# Patient Record
Sex: MALE | Race: WHITE | Employment: FULL TIME | URBAN - METROPOLITAN AREA
[De-identification: names, ages, dates, MRNs, and addresses within clinical notes are randomized per-mention and may not be internally consistent; named-entity substitution may affect disease eponyms.]

---

## 2017-04-19 ENCOUNTER — HOSPITAL ENCOUNTER (OUTPATIENT)
Dept: ONCOLOGY | Age: 57
Discharge: HOME | End: 2017-04-19

## 2017-04-19 ENCOUNTER — HOSPITAL ENCOUNTER (OUTPATIENT)
Dept: LAB | Age: 57
Discharge: HOME | End: 2017-04-19
Payer: COMMERCIAL

## 2017-04-19 ENCOUNTER — OFFICE VISIT (OUTPATIENT)
Dept: ONCOLOGY | Age: 57
End: 2017-04-19

## 2017-04-19 VITALS
TEMPERATURE: 97.6 F | HEART RATE: 106 BPM | DIASTOLIC BLOOD PRESSURE: 99 MMHG | WEIGHT: 266 LBS | SYSTOLIC BLOOD PRESSURE: 149 MMHG | HEIGHT: 65 IN | BODY MASS INDEX: 44.32 KG/M2

## 2017-04-19 DIAGNOSIS — D72.9 NEUTROPHILIC LEUKOCYTOSIS: ICD-10-CM

## 2017-04-19 DIAGNOSIS — D72.9 NEUTROPHILIC LEUKOCYTOSIS: Primary | ICD-10-CM

## 2017-04-19 LAB
ALBUMIN SERPL BCP-MCNC: 3.7 G/DL (ref 3.4–5)
ALBUMIN/GLOB SERPL: 1.2 {RATIO} (ref 0.8–1.7)
ALP SERPL-CCNC: 65 U/L (ref 45–117)
ALT SERPL-CCNC: 47 U/L (ref 16–61)
ANION GAP BLD CALC-SCNC: 11 MMOL/L (ref 3–18)
AST SERPL W P-5'-P-CCNC: 17 U/L (ref 15–37)
BASO+EOS+MONOS # BLD AUTO: 1.2 K/UL (ref 0–2.3)
BASO+EOS+MONOS # BLD AUTO: 10 % (ref 0.1–17)
BILIRUB SERPL-MCNC: 1 MG/DL (ref 0.2–1)
BUN SERPL-MCNC: 12 MG/DL (ref 7–18)
BUN/CREAT SERPL: 13 (ref 12–20)
CALCIUM SERPL-MCNC: 9.1 MG/DL (ref 8.5–10.1)
CHLORIDE SERPL-SCNC: 99 MMOL/L (ref 100–108)
CO2 SERPL-SCNC: 29 MMOL/L (ref 21–32)
COMMENT , 490046: NORMAL
CREAT SERPL-MCNC: 0.96 MG/DL (ref 0.6–1.3)
DIFFERENTIAL METHOD BLD: ABNORMAL
ERYTHROCYTE [DISTWIDTH] IN BLOOD BY AUTOMATED COUNT: 13.6 % (ref 11.5–14.5)
FLOW INTERPRETATION: NORMAL
GLOBULIN SER CALC-MCNC: 3.1 G/DL (ref 2–4)
GLUCOSE SERPL-MCNC: 102 MG/DL (ref 74–99)
HCT VFR BLD AUTO: 47.8 % (ref 36–48)
HGB BLD-MCNC: 15.8 G/DL (ref 12–16)
LYMPHOCYTES # BLD AUTO: 24 % (ref 14–44)
LYMPHOCYTES # BLD: 2.8 K/UL (ref 1.1–5.9)
MCH RBC QN AUTO: 28.7 PG (ref 25–35)
MCHC RBC AUTO-ENTMCNC: 33.1 G/DL (ref 31–37)
MCV RBC AUTO: 86.8 FL (ref 78–102)
NEUTS SEG # BLD: 8 K/UL (ref 1.8–9.5)
NEUTS SEG NFR BLD AUTO: 66 % (ref 40–70)
PATHOLOGIST NAME: NORMAL
PLATELET # BLD AUTO: 241 K/UL (ref 140–440)
POTASSIUM SERPL-SCNC: 4.2 MMOL/L (ref 3.5–5.5)
PROT SERPL-MCNC: 6.8 G/DL (ref 6.4–8.2)
RBC # BLD AUTO: 5.51 M/UL (ref 4.1–5.1)
SODIUM SERPL-SCNC: 139 MMOL/L (ref 136–145)
SPECIMEN SOURCE: NORMAL
SPECIMEN SOURCE: NORMAL
WBC # BLD AUTO: 12 K/UL (ref 4.5–13)

## 2017-04-19 PROCEDURE — 36415 COLL VENOUS BLD VENIPUNCTURE: CPT | Performed by: INTERNAL MEDICINE

## 2017-04-19 PROCEDURE — 88184 FLOWCYTOMETRY/ TC 1 MARKER: CPT | Performed by: INTERNAL MEDICINE

## 2017-04-19 PROCEDURE — 88185 FLOWCYTOMETRY/TC ADD-ON: CPT | Performed by: INTERNAL MEDICINE

## 2017-04-19 PROCEDURE — 80053 COMPREHEN METABOLIC PANEL: CPT | Performed by: INTERNAL MEDICINE

## 2017-04-19 NOTE — PATIENT INSTRUCTIONS
Complete Blood Count (CBC): About This Test  What is it? A complete blood count (CBC) is a blood test that gives important information about your blood cells, especially red blood cells, white blood cells, and platelets. Why is this test done? A CBC may be done as part of a regular physical exam. There are many other reasons that a doctor may want this blood test, including to:  · Find the cause of symptoms such as fatigue, weakness, fever, bruising, or weight loss. · Find anemia or an infection. · See how much blood has been lost if there is bleeding. · Diagnose diseases of the blood, such as leukemia or polycythemia. How can you prepare for the test?  You do not need to do anything before having this test.  What happens during the test?  The health professional taking a sample of your blood will:  · Wrap an elastic band around your upper arm. This makes the veins below the band larger so it is easier to put a needle into the vein. · Clean the needle site with alcohol. · Put the needle into the vein. · Attach a tube to the needle to fill it with blood. · Remove the band from your arm when enough blood is collected. · Put a gauze pad or cotton ball over the needle site as the needle is removed. · Put pressure on the site and then put on a bandage. If this blood test is done on a baby, a heel stick may be done instead of a blood draw from a vein. What happens after the test?  · You will probably be able to go home right away. · You can go back to your usual activities right away. Follow-up care is a key part of your treatment and safety. Be sure to make and go to all appointments, and call your doctor if you are having problems. It's also a good idea to keep a list of the medicines you take. Ask your doctor when you can expect to have your test results. Where can you learn more? Go to http://nancy-almita.info/.   Enter X388 in the search box to learn more about \"Complete Blood Count (CBC): About This Test.\"  Current as of: October 14, 2016  Content Version: 11.2  © 7697-8273 [employer]orporated. Care instructions adapted under license by [employer] (which disclaims liability or warranty for this information). If you have questions about a medical condition or this instruction, always ask your healthcare professional. [place] [address] any warranty or liability for your use of this information.

## 2017-04-19 NOTE — PROGRESS NOTES
Hematology/Oncology Consultation Note    Name: Paris Griffin  Date: 2017  : 1960    PCP: Sami Weiss MD       Mr. Maria Guadalupe Robledo  is a 61 y.o. man who was referred for evaluation of unexplained leukocytosis. Subjective:     Chief complaint: Elevated WBC count    History of present illness:  Mr. Maria Guadalupe Robledo is a 58-year-old man who has a long-standing history of hypertension, asthma, and vitamin D deficiency. Starting in 2015 he was noted to have an elevation in his WBC count at 11.5. He states that by 2015 the count remained elevated at 11.4. There was a significant decline in his WBC count in  and in March of that year his WBC count was 10.9. The patient reports that he has no problems with recurrent skin rashes or infections. He has no complaints with progressive lymphadenopathy, night sweats, or weight loss. A recent CBC done last month showed that his WBC count was up to 12.3. This was done on 3/24/2017. Based on the most recent lab test the decision was made to refer the patient for an assessment of his unexplained leukocytosis. Clinically, the patient has no physical complaints to report at this time. Past Medical History:   Diagnosis Date    Asthma     Hypertension        Allergies   Allergen Reactions    Peanut Anaphylaxis       Past Surgical History:   Procedure Laterality Date    HX VASECTOMY         Social History     Social History    Marital status:      Spouse name: N/A    Number of children: N/A    Years of education: N/A     Occupational History    Not on file.      Social History Main Topics    Smoking status: Never Smoker    Smokeless tobacco: Not on file    Alcohol use No    Drug use: No    Sexual activity: Not on file     Other Topics Concern    Not on file     Social History Narrative       Family History   Problem Relation Age of Onset    Arthritis-osteo Mother     Hypertension Mother     Arthritis-osteo Father        Current Outpatient Prescriptions   Medication Sig Dispense Refill    diazepam (VALIUM) 5 mg tablet Take 1 Tab by mouth every eight (8) hours as needed for Anxiety. Max Daily Amount: 15 mg. 20 Tab 0    ondansetron (ZOFRAN ODT) 4 mg disintegrating tablet Take 1 Tab by mouth every eight (8) hours as needed for Nausea. 12 Tab 0    amLODIPine-benazepril (LOTREL) 5-10 mg per capsule Take 1 Cap by mouth every other day.  simvastatin (ZOCOR) 10 mg tablet Take 10 mg by mouth nightly.  montelukast (SINGULAIR) 10 mg tablet Take 10 mg by mouth daily.  fluticasone-salmeterol (ADVAIR) 100-50 mcg/dose diskus inhaler Take 1 Puff by inhalation every twelve (12) hours.  cholecalciferol, vitamin D3, 2,000 unit tab Take  by mouth. Review of Systems    General ROS:The patient has no complaints and there is no physical distress evident. Psychological ROS: patient denies having any psychological symptoms such as hallucinations, depression or anxiety. Ophthalmic ROS:the patient denies having any visual impairment or eye discomfort. ENT ROS: there are no abnormalities reported. Allergy and Immunology ROS:the patient denies having any seasonal allergies or allergies to medications other than those already outlined above. Hematological and Lymphatic ROS: the patient denies having any bruising, bleeding or lymphadenopathy. Endocrine ROS: the patient denies having any heat or cold intolerance. There is no history of diabetes or thyroid disorders. Breast ROS: the patient denies having any history of breast mass, nipple discharge, or lumps. Respiratory ROS:the patient denies having any cough, shortness of breath, or dyspnea on exertion. Cardiovascular ROS: there are no complaints of chest pain, palpitations, chest pounding, or dyspnea on exertion. Gastrointestinal ROS: the patient denies having nausea, emesis, diarrhea, constipation, or blood in the stool.   Genito-Urinary ROS: the patient denies having urinary urgency, frequency, or dysuria. Musculoskeletal ROS: with the exception of mild arthralgias the patient has no other musculoskeletal complaints. Neurological ROS: the patient denies having any numbness, tingling, or neurologic deficits. Dermatological ROS:patient denies having any unexplained rash, skin ulcerations, or hives. Objective:     Visit Vitals    BP (!) 149/99    Pulse (!) 106    Temp 97.6 °F (36.4 °C)    Ht 5' 5\" (1.651 m)    Wt 120.7 kg (266 lb)    BMI 44.26 kg/m2        Physical Exam:   Gen. Appearance: the patient is in no acute distress. Skin: There is no evidence of bruise or rash. HEENT: The head is normocephalic and atraumatic. The conjunctiva and sclera are clear. Pupils are equal, round, reactive to light, and accommodation. The extraocular movements are intact. ENT reveals no oral mucosal lesions or ulcerations. Neck: Supple without lymphadenopathy or thyromegaly. Lungs: Clear to auscultation and percussion; there are no wheezes or rhonchi. Heart: Regular rate and rhythm; there are no murmurs, gallops, or rubs. Abdomen: Bowel sounds are present and normal.  There is no guarding, tenderness, or hepatosplenomegaly. Extremities: There is no clubbing, cyanosis, or edema. Neurologic: There are no focal neurologic deficits. Lymphatics: There is no palpable peripheral lymphadenopathy. Lab data: BC dated 3/24/2017 showed a WBC count of 12.3, hemoglobin 15.2 g/dL, hematocrit 47.4%, and the platelet count was 705,565. Differential WBC count showed increase in the absolute neutrophil count 8, and increase in the absolute monocyte count to 1 and an increase in the absolute eosinophil count of 0.6. Assessment:   Leukocytosis: I have explained to the patient that the diagnostic differential includes a reactive leukocytosis versus an evolving myeloproliferative disorder such as CML.   CLL is less likely since his total lymphocyte count is not elevated with the below normal percentage of lymphocytes at 21% and an absolute lymphocyte count of 2.6 which is normal.      Plan:   Leukocytosis: At this time I will order a comprehensive metabolic panel and an immunophenotyping profile to assess for immunophenotypic activity or abnormalities in his leukocyte populations. This will either represent a reactive leukocytosis or a slowly evolving myeloproliferative disorder. Pending the results of these tests, I have explained to the patient, he may need to undergo a bone marrow biopsy. Therefore I have briefly reviewed the risk and benefit profile of the bone marrow biopsy procedure with the patient. I will have him return in 2 weeks to review test results and discuss options of management. Follow-up in 2 weeks    Orders Placed This Encounter    COMPLETE CBC & AUTO DIFF WBC    InHouse CBC ([facility])     Standing Status:   Future     Number of Occurrences:   1     Standing Expiration Date:   8/91/4186    METABOLIC PANEL, COMPREHENSIVE     Standing Status:   Future     Number of Occurrences:   1     Standing Expiration Date:   4/20/2018    IMMUNOPHENOTYPING PROFILE     Standing Status:   Future     Number of Occurrences:   1     Standing Expiration Date:   4/20/2018     Order Specific Question:   Specimen type     Answer:   Blood [2]           Angela Forrest MD  4/19/2017      Please note: This document has been produced using voice recognition software. Unrecognized errors in transcription may be present.

## 2017-04-19 NOTE — MR AVS SNAPSHOT
Visit Information Date & Time Provider Department Dept. Phone Encounter #  
 4/19/2017  3:00 PM Diane Churchill MD [facility] [phone] [id number] Follow-up Instructions Return in about 2 weeks (around 5/3/2017). Your Appointments 5/3/2017  3:30 PM  
Office Visit with Diane Churchill MD  
[facility]) Appt Note: 2 WK Ret Results Review [address] [facility] [address] [address]  
[address]  
  
   
 [address] [facility] [address] [address] Upcoming Health Maintenance Date Due Hepatitis C Screening 1960 FOBT Q 1 YEAR AGE 50-75 7/5/2009 INFLUENZA AGE 9 TO ADULT 8/1/2016 DTaP/Tdap/Td series (2 - Td) 7/31/2026 Allergies as of 4/19/2017  Review Complete On: 4/19/2017 By: Diane Churchill MD  
  
 Severity Noted Reaction Type Reactions Peanut High 04/25/2016    Anaphylaxis Current Immunizations  Never Reviewed Name Date Tdap 7/31/2016  3:51 PM  
  
 Not reviewed this visit You Were Diagnosed With   
  
 Codes Comments Neutrophilic leukocytosis    -  Primary ICD-10-CM: D72.9 ICD-9-CM: 917. 8 Vitals Smoking Status Never Smoker Your Updated Medication List  
  
   
This list is accurate as of: 4/19/17  4:10 PM.  Always use your most recent med list. amLODIPine-benazepril 5-10 mg per capsule Commonly known as:  Stacey Dover Take 1 Cap by mouth every other day. cholecalciferol (vitamin D3) 2,000 unit Tab Take  by mouth. diazePAM 5 mg tablet Commonly known as:  VALIUM Take 1 Tab by mouth every eight (8) hours as needed for Anxiety. Max Daily Amount: 15 mg.  
  
 fluticasone-salmeterol 100-50 mcg/dose diskus inhaler Commonly known as:  ADVAIR Take 1 Puff by inhalation every twelve (12) hours. montelukast 10 mg tablet Commonly known as:  SINGULAIR Take 10 mg by mouth daily. ondansetron 4 mg disintegrating tablet Commonly known as:  ZOFRAN ODT Take 1 Tab by mouth every eight (8) hours as needed for Nausea. simvastatin 10 mg tablet Commonly known as:  ZOCOR Take 10 mg by mouth nightly. We Performed the Following COMPLETE CBC & AUTO DIFF WBC [20985 CPT(R)] Follow-up Instructions Return in about 2 weeks (around 5/3/2017). To-Do List   
 04/19/2017 Lab:  CBC WITH 3 PART DIFF   
  
 04/19/2017 Lab:  IMMUNOPHENOTYPING PROFILE   
  
 04/19/2017 Lab:  METABOLIC PANEL, COMPREHENSIVE Patient Instructions Complete Blood Count (CBC): About This Test 
What is it? A complete blood count (CBC) is a blood test that gives important information about your blood cells, especially red blood cells, white blood cells, and platelets. Why is this test done? A CBC may be done as part of a regular physical exam. There are many other reasons that a doctor may want this blood test, including to: · Find the cause of symptoms such as fatigue, weakness, fever, bruising, or weight loss. · Find anemia or an infection. · See how much blood has been lost if there is bleeding. · Diagnose diseases of the blood, such as leukemia or polycythemia. How can you prepare for the test? 
You do not need to do anything before having this test. 
What happens during the test? 
The health professional taking a sample of your blood will: · Wrap an elastic band around your upper arm. This makes the veins below the band larger so it is easier to put a needle into the vein. · Clean the needle site with alcohol. · Put the needle into the vein. · Attach a tube to the needle to fill it with blood. · Remove the band from your arm when enough blood is collected. · Put a gauze pad or cotton ball over the needle site as the needle is removed. · Put pressure on the site and then put on a bandage. If this blood test is done on a baby, a heel stick may be done instead of a blood draw from a vein. What happens after the test? 
· You will probably be able to go home right away. · You can go back to your usual activities right away. Follow-up care is a key part of your treatment and safety. Be sure to make and go to all appointments, and call your doctor if you are having problems. It's also a good idea to keep a list of the medicines you take. Ask your doctor when you can expect to have your test results. Where can you learn more? Go to http://nancy-almita.info/. Enter T205 in the search box to learn more about \"Complete Blood Count (CBC): About This Test.\" Current as of: October 14, 2016 Content Version: 11.2 © 8138-1712 Healthwise, Incorporated. Care instructions adapted under license by [employer] (which disclaims liability or warranty for this information). If you have questions about a medical condition or this instruction, always ask your healthcare professional. [address] any warranty or liability for your use of this information. Introducing [facility]! [facility] introduces Adapt patient portal. Now you can access parts of your medical record, email your doctor's office, and request medication refills online. 1. In your internet browser, go to [email or web address] 2. Click on the First Time User? Click Here link in the Sign In box. You will see the New Member Sign Up page. 3. Enter your Adapt Access Code exactly as it appears below. You will not need to use this code after youve completed the sign-up process. If you do not sign up before the expiration date, you must request a new code. · Adapt Access Code: [email or web address] Expires: 7/18/2017  4:10 PM 
 
4.  Enter the last four digits of your Social Security Number (xxxx) and Date of Birth (mm/dd/yyyy) as indicated and click Submit. You will be taken to the next sign-up page. 5. Create a BroadClip ID. This will be your BroadClip login ID and cannot be changed, so think of one that is secure and easy to remember. 6. Create a BroadClip password. You can change your password at any time. 7. Enter your Password Reset Question and Answer. This can be used at a later time if you forget your password. 8. Enter your e-mail address. You will receive e-mail notification when new information is available in [address] 9. Click Sign Up. You can now view and download portions of your medical record. 10. Click the Download Summary menu link to download a portable copy of your medical information. If you have questions, please visit the Frequently Asked Questions section of the BroadClip website. Remember, BroadClip is NOT to be used for urgent needs. For medical emergencies, dial 911. Now available from your iPhone and Android! Please provide this summary of care documentation to your next provider. Your primary care clinician is listed as Margarita Smith. If you have any questions after today's visit, please call [phone].

## 2017-05-03 ENCOUNTER — HOSPITAL ENCOUNTER (OUTPATIENT)
Dept: ONCOLOGY | Age: 57
Discharge: HOME | End: 2017-05-03

## 2017-05-03 ENCOUNTER — OFFICE VISIT (OUTPATIENT)
Dept: ONCOLOGY | Age: 57
End: 2017-05-03

## 2017-05-03 VITALS
SYSTOLIC BLOOD PRESSURE: 133 MMHG | TEMPERATURE: 98 F | WEIGHT: 259 LBS | HEIGHT: 65 IN | BODY MASS INDEX: 43.15 KG/M2 | DIASTOLIC BLOOD PRESSURE: 77 MMHG | HEART RATE: 116 BPM

## 2017-05-03 DIAGNOSIS — D72.9 NEUTROPHILIC LEUKOCYTOSIS: ICD-10-CM

## 2017-05-03 DIAGNOSIS — D72.9 NEUTROPHILIC LEUKOCYTOSIS: Primary | ICD-10-CM

## 2017-05-03 NOTE — PROGRESS NOTES
Hematology/medical oncology progress note    5/3/2017  Wei Harris  YOB: 1960    PCP: Dr. Duane Longoria    Diagnosis: Benign leukocytosis    Mr. Mihai Hernandez is a 54-year-old man who was referred for evaluation of leukocytosis. He was initially seen on 4/19/2017. The CBC at that time showed that his WBC count had normalized to 12, hemoglobin was 15.8 g/dL with hematocrit of 47.8%, and the platelet count was 20 41,000. I explained to the patient that flow cytometry was obtained on his peripheral blood at that time. There was no diagnostic immunophenotypic abnormalities detected. There was no immunophenotypic evidence of a lymphoproliferative disorder, acute leukemia, or circulating blasts. There was a mild increase in the T-cell CD4/CD8 ratio. However the patient reported that he had been on steroids up to about 2 weeks before he was seen in clinic. I have explained to the patient that dosing of steroids can cause an increase in the leukocytes particularly lymphocyte populations. The patient therefore has no pathologic abnormality. The leukocytosis was a benign process. I will monitor him at 3 month intervals over the next 9-12 months. The patient had his questions answered to his satisfaction. The total time was 25 minutes, greater than 50% of the time was in counseling and coordination of care. Delmer Spicer MD, [address]

## 2017-05-03 NOTE — PATIENT INSTRUCTIONS
Complete Blood Count (CBC): About This Test  What is it? A complete blood count (CBC) is a blood test that gives important information about your blood cells, especially red blood cells, white blood cells, and platelets. Why is this test done? A CBC may be done as part of a regular physical exam. There are many other reasons that a doctor may want this blood test, including to:  · Find the cause of symptoms such as fatigue, weakness, fever, bruising, or weight loss. · Find anemia or an infection. · See how much blood has been lost if there is bleeding. · Diagnose diseases of the blood, such as leukemia or polycythemia. How can you prepare for the test?  You do not need to do anything before having this test.  What happens during the test?  The health professional taking a sample of your blood will:  · Wrap an elastic band around your upper arm. This makes the veins below the band larger so it is easier to put a needle into the vein. · Clean the needle site with alcohol. · Put the needle into the vein. · Attach a tube to the needle to fill it with blood. · Remove the band from your arm when enough blood is collected. · Put a gauze pad or cotton ball over the needle site as the needle is removed. · Put pressure on the site and then put on a bandage. If this blood test is done on a baby, a heel stick may be done instead of a blood draw from a vein. What happens after the test?  · You will probably be able to go home right away. · You can go back to your usual activities right away. Follow-up care is a key part of your treatment and safety. Be sure to make and go to all appointments, and call your doctor if you are having problems. It's also a good idea to keep a list of the medicines you take. Ask your doctor when you can expect to have your test results. Where can you learn more? Go to http://nancy-almita.info/.   Enter C100 in the search box to learn more about \"Complete Blood Count (CBC): About This Test.\"  Current as of: October 14, 2016  Content Version: 11.2  © 2467-2805 [employer]. Care instructions adapted under license by [employer] (which disclaims liability or warranty for this information). If you have questions about a medical condition or this instruction, always ask your healthcare professional. [address] any warranty or liability for your use of this information. White Blood Cell Differential: About This Test  What is it? A white blood cell differential counts the different types of white blood cells in a blood sample. There are five major kinds of white blood cells. The numbers of each type of white blood cell give important information about your immune system. Why is this test done? The test helps to measure how the body is responding to different types of infections. It can also help measure certain allergic reactions. It can sometimes be helpful in finding the stage of leukemia or how the body is responding to chemotherapy. How can you prepare for the test?  In general, you dont need to prepare before having this test. Your doctor may give you some specific instructions. What happens during the test?  A health professional takes a sample of your blood. What else should you know about the test?  · Your results will include an explanation of what a \"normal\" result is. This is called a \"reference range. \" It is just a guide. Your doctor will evaluate your results based on your health and other factors. This means that a value that falls outside the normal values listed may still be normal for you. · Your doctor may order a blood smear test to be done at the same time. In this test, a drop of blood is smeared on a slide and stained with a special dye. The slide is looked at under a microscope. The number, size, and shape of red blood cells, white blood cells, and platelets are recorded.  Blood cells with different shapes or sizes can help diagnose many blood diseases, such as leukemia, malaria, and sickle cell disease. How long does the test take? · This test will take a few minutes. What happens after the test?  · You will probably be able to go home right away. · You can go back to your usual activities right away. Follow-up care is a key part of your treatment and safety. Be sure to make and go to all appointments, and call your doctor if you are having problems. It's also a good idea to keep a list of the medicines you take. Ask your doctor when you can expect to have your test results. Where can you learn more? Go to http://nancy-almita.info/. Enter T637 in the search box to learn more about \"White Blood Cell Differential: About This Test.\"  Current as of: October 14, 2016  Content Version: 11.2  © 2999-7408 [employer], Incorporated. Care instructions adapted under license by [employer]earch (which disclaims liability or warranty for this information). If you have questions about a medical condition or this instruction, always ask your healthcare professional. [address] any warranty or liability for your use of this information.

## 2021-02-16 ENCOUNTER — TRANSCRIBE ORDER (OUTPATIENT)
Dept: SCHEDULING | Age: 61
End: 2021-02-16

## 2021-02-16 DIAGNOSIS — Z13.6 SCREENING FOR HEART DISEASE: Primary | ICD-10-CM

## 2021-03-06 ENCOUNTER — HOSPITAL ENCOUNTER (OUTPATIENT)
Dept: CT IMAGING | Age: 61
Discharge: HOME | End: 2021-03-06
Attending: INTERNAL MEDICINE
Payer: SELF-PAY

## 2021-03-06 DIAGNOSIS — Z13.6 SCREENING FOR HEART DISEASE: ICD-10-CM

## 2021-03-06 PROCEDURE — 75571 CT HRT W/O DYE W/CA TEST: CPT

## 2021-03-23 ENCOUNTER — TELEPHONE (OUTPATIENT)
Dept: OTHER | Age: 61
End: 2021-03-23

## 2021-03-24 NOTE — TELEPHONE ENCOUNTER
Results mailed to patient with education with instructions to follow up with PCM to discuss the radiology report and for follow up care.

## 2021-11-07 ENCOUNTER — HOSPITAL ENCOUNTER (OUTPATIENT)
Dept: GENERAL RADIOLOGY | Age: 61
Discharge: HOME | End: 2021-11-07
Payer: COMMERCIAL

## 2021-11-07 ENCOUNTER — TRANSCRIBE ORDER (OUTPATIENT)
Dept: REGISTRATION | Age: 61
End: 2021-11-07

## 2021-11-07 DIAGNOSIS — D72.10 EOSINOPHILIA, UNSPECIFIED: Primary | ICD-10-CM

## 2021-11-07 DIAGNOSIS — D72.10 EOSINOPHILIA, UNSPECIFIED: ICD-10-CM

## 2021-11-07 PROCEDURE — 71046 X-RAY EXAM CHEST 2 VIEWS: CPT

## 2022-02-28 ENCOUNTER — APPOINTMENT (OUTPATIENT)
Dept: GENERAL RADIOLOGY | Age: 62
End: 2022-02-28
Attending: STUDENT IN AN ORGANIZED HEALTH CARE EDUCATION/TRAINING PROGRAM
Payer: COMMERCIAL

## 2022-02-28 ENCOUNTER — HOSPITAL ENCOUNTER (EMERGENCY)
Age: 62
Discharge: HOME | End: 2022-03-01
Attending: STUDENT IN AN ORGANIZED HEALTH CARE EDUCATION/TRAINING PROGRAM
Payer: COMMERCIAL

## 2022-02-28 DIAGNOSIS — T78.2XXA ANAPHYLAXIS, INITIAL ENCOUNTER: Primary | ICD-10-CM

## 2022-02-28 LAB
ANION GAP SERPL CALC-SCNC: 6 MMOL/L (ref 3–18)
BASOPHILS # BLD: 0.1 K/UL (ref 0–0.1)
BASOPHILS NFR BLD: 1 % (ref 0–2)
BUN SERPL-MCNC: 21 MG/DL (ref 7–18)
BUN/CREAT SERPL: 22 (ref 12–20)
CALCIUM SERPL-MCNC: 8.7 MG/DL (ref 8.5–10.1)
CHLORIDE SERPL-SCNC: 105 MMOL/L (ref 100–111)
CO2 SERPL-SCNC: 29 MMOL/L (ref 21–32)
CREAT SERPL-MCNC: 0.95 MG/DL (ref 0.6–1.3)
DIFFERENTIAL METHOD BLD: ABNORMAL
EOSINOPHIL # BLD: 0.7 K/UL (ref 0–0.4)
EOSINOPHIL NFR BLD: 6 % (ref 0–5)
ERYTHROCYTE [DISTWIDTH] IN BLOOD BY AUTOMATED COUNT: 13.2 % (ref 11.6–14.5)
GLUCOSE SERPL-MCNC: 87 MG/DL (ref 74–99)
HCT VFR BLD AUTO: 48.6 % (ref 36–48)
HGB BLD-MCNC: 15.8 G/DL (ref 13–16)
IMM GRANULOCYTES # BLD AUTO: 0.2 K/UL (ref 0–0.04)
IMM GRANULOCYTES NFR BLD AUTO: 1 % (ref 0–0.5)
LYMPHOCYTES # BLD: 3.1 K/UL (ref 0.9–3.6)
LYMPHOCYTES NFR BLD: 27 % (ref 21–52)
MCH RBC QN AUTO: 27.9 PG (ref 24–34)
MCHC RBC AUTO-ENTMCNC: 32.5 G/DL (ref 31–37)
MCV RBC AUTO: 85.9 FL (ref 78–100)
MONOCYTES # BLD: 0.9 K/UL (ref 0.05–1.2)
MONOCYTES NFR BLD: 8 % (ref 3–10)
NEUTS SEG # BLD: 6.5 K/UL (ref 1.8–8)
NEUTS SEG NFR BLD: 57 % (ref 40–73)
NRBC # BLD: 0 K/UL (ref 0–0.01)
NRBC BLD-RTO: 0 PER 100 WBC
PLATELET # BLD AUTO: 261 K/UL (ref 135–420)
PMV BLD AUTO: 9.4 FL (ref 9.2–11.8)
POTASSIUM SERPL-SCNC: 3.7 MMOL/L (ref 3.5–5.5)
RBC # BLD AUTO: 5.66 M/UL (ref 4.35–5.65)
SODIUM SERPL-SCNC: 140 MMOL/L (ref 136–145)
TROPONIN-HIGH SENSITIVITY: 7 NG/L (ref 0–78)
WBC # BLD AUTO: 11.4 K/UL (ref 4.6–13.2)

## 2022-02-28 PROCEDURE — 85025 COMPLETE CBC W/AUTO DIFF WBC: CPT

## 2022-02-28 PROCEDURE — 96374 THER/PROPH/DIAG INJ IV PUSH: CPT

## 2022-02-28 PROCEDURE — 94640 AIRWAY INHALATION TREATMENT: CPT

## 2022-02-28 PROCEDURE — 99285 EMERGENCY DEPT VISIT HI MDM: CPT

## 2022-02-28 PROCEDURE — 93005 ELECTROCARDIOGRAM TRACING: CPT

## 2022-02-28 PROCEDURE — 74011000250 HC RX REV CODE- 250: Performed by: STUDENT IN AN ORGANIZED HEALTH CARE EDUCATION/TRAINING PROGRAM

## 2022-02-28 PROCEDURE — 96361 HYDRATE IV INFUSION ADD-ON: CPT

## 2022-02-28 PROCEDURE — 84484 ASSAY OF TROPONIN QUANT: CPT

## 2022-02-28 PROCEDURE — 71045 X-RAY EXAM CHEST 1 VIEW: CPT

## 2022-02-28 PROCEDURE — 96372 THER/PROPH/DIAG INJ SC/IM: CPT

## 2022-02-28 PROCEDURE — 74011250636 HC RX REV CODE- 250/636: Performed by: STUDENT IN AN ORGANIZED HEALTH CARE EDUCATION/TRAINING PROGRAM

## 2022-02-28 PROCEDURE — 96375 TX/PRO/DX INJ NEW DRUG ADDON: CPT

## 2022-02-28 PROCEDURE — 80048 BASIC METABOLIC PNL TOTAL CA: CPT

## 2022-02-28 RX ORDER — ALBUTEROL SULFATE 0.83 MG/ML
2.5 SOLUTION RESPIRATORY (INHALATION)
Status: COMPLETED | OUTPATIENT
Start: 2022-02-28 | End: 2022-02-28

## 2022-02-28 RX ORDER — ALBUTEROL SULFATE 0.83 MG/ML
5 SOLUTION RESPIRATORY (INHALATION)
Status: COMPLETED | OUTPATIENT
Start: 2022-02-28 | End: 2022-02-28

## 2022-02-28 RX ORDER — EPINEPHRINE 1 MG/ML
0.3 INJECTION, SOLUTION, CONCENTRATE INTRAVENOUS ONCE
Status: COMPLETED | OUTPATIENT
Start: 2022-02-28 | End: 2022-02-28

## 2022-02-28 RX ORDER — EPINEPHRINE 1 MG/ML
0.5 INJECTION, SOLUTION, CONCENTRATE INTRAVENOUS ONCE
Status: COMPLETED | OUTPATIENT
Start: 2022-02-28 | End: 2022-02-28

## 2022-02-28 RX ORDER — IPRATROPIUM BROMIDE AND ALBUTEROL SULFATE 2.5; .5 MG/3ML; MG/3ML
3 SOLUTION RESPIRATORY (INHALATION)
Status: DISCONTINUED | OUTPATIENT
Start: 2022-02-28 | End: 2022-02-28 | Stop reason: CLARIF

## 2022-02-28 RX ORDER — DIPHENHYDRAMINE HYDROCHLORIDE 50 MG/ML
50 INJECTION, SOLUTION INTRAMUSCULAR; INTRAVENOUS ONCE
Status: COMPLETED | OUTPATIENT
Start: 2022-02-28 | End: 2022-02-28

## 2022-02-28 RX ORDER — EPINEPHRINE 1 MG/ML
0.5 INJECTION, SOLUTION, CONCENTRATE INTRAVENOUS ONCE
Status: DISCONTINUED | OUTPATIENT
Start: 2022-02-28 | End: 2022-02-28

## 2022-02-28 RX ADMIN — ALBUTEROL SULFATE 2.5 MG: 2.5 SOLUTION RESPIRATORY (INHALATION) at 18:12

## 2022-02-28 RX ADMIN — EPINEPHRINE 0.5 MG: 1 INJECTION INTRAMUSCULAR; INTRAVENOUS; SUBCUTANEOUS at 18:11

## 2022-02-28 RX ADMIN — EPINEPHRINE 0.5 MG: 1 INJECTION INTRAMUSCULAR; INTRAVENOUS; SUBCUTANEOUS at 17:47

## 2022-02-28 RX ADMIN — EPINEPHRINE 0.3 MG: 1 INJECTION INTRAMUSCULAR; INTRAVENOUS; SUBCUTANEOUS at 16:39

## 2022-02-28 RX ADMIN — FAMOTIDINE 20 MG: 10 INJECTION INTRAVENOUS at 17:47

## 2022-02-28 RX ADMIN — SODIUM CHLORIDE 1000 ML: 900 INJECTION, SOLUTION INTRAVENOUS at 16:53

## 2022-02-28 RX ADMIN — SODIUM CHLORIDE 1000 ML: 900 INJECTION, SOLUTION INTRAVENOUS at 17:44

## 2022-02-28 RX ADMIN — ALBUTEROL SULFATE 5 MG: 2.5 SOLUTION RESPIRATORY (INHALATION) at 17:48

## 2022-02-28 RX ADMIN — METHYLPREDNISOLONE SODIUM SUCCINATE 125 MG: 125 INJECTION, POWDER, FOR SOLUTION INTRAMUSCULAR; INTRAVENOUS at 16:48

## 2022-02-28 RX ADMIN — DIPHENHYDRAMINE HYDROCHLORIDE 50 MG: 50 INJECTION, SOLUTION INTRAMUSCULAR; INTRAVENOUS at 16:50

## 2022-02-28 RX ADMIN — ALBUTEROL SULFATE 1 DOSE: 2.5 SOLUTION RESPIRATORY (INHALATION) at 17:07

## 2022-02-28 NOTE — ED TRIAGE NOTES
Patient states \"that he was eating seafood shrimp with some sort of sauce around 1515 and now he is having an allergic reaction.

## 2022-02-28 NOTE — ED PROVIDER NOTES
EMERGENCY DEPARTMENT HISTORY AND PHYSICAL EXAM    I have evaluated the patient at 4:34 PM      Date: 2/28/2022  Patient Name: Lon Mcghee    History of Presenting Illness     Chief Complaint   Patient presents with    Allergic Reaction         History Provided By: Patient  Location/Duration/Severity/Modifying factors    69-year-old male with history of peanut allergy sent to the emergency department for evaluation of an allergic reaction. Patient ate a shellfish seafood meal a little after 3:00 with an unknown sauce. States that afterward he started developing throat swelling and wheezing but states that this is different from his anaphylaxis peanut so he did not inject himself with an EpiPen. However he felt the symptoms were persistent and came here for evaluation. He is still able to swallow although he states that it is painful. He denies any vomiting or diarrhea. PCP: Grayce Romberg, MD    Current Outpatient Medications   Medication Sig Dispense Refill    albuterol (PROVENTIL HFA, VENTOLIN HFA, PROAIR HFA) 90 mcg/actuation inhaler albuterol sulfate HFA 90 mcg/actuation aerosol inhaler      galcanezumab-gnlm (EMGALITY PEN) 120 mg/mL injection Emgality Pen 120 mg/mL subcutaneous pen injector      nortriptyline HCl (PAMELOR PO) nortriptyline   30mg 1 po qday      diphenhydrAMINE (BENADRYL) 25 mg capsule Take 1 Cap by mouth every six (6) hours as needed. 20 Cap 0    famotidine (PEPCID) 20 mg tablet Take 1 Tab by mouth two (2) times a day. 20 Tab 0    red yeast rice 600 mg tab Take  by Mouth.  cholecalciferol, vitamin D3, 2,000 unit tab Take  by Mouth.  fexofenadine (ALLEGRA) 180 mg tablet Take  by mouth.  fluticasone propionate (FLONASE NA) by Nasal route.  topiramate (TOPAMAX) 100 mg tablet Take  by mouth two (2) times a day.          Past History     Past Medical History:  Past Medical History:   Diagnosis Date    Asthma     Elevated PSA     Hypertension        Past Surgical History:  Past Surgical History:   Procedure Laterality Date    HX VASECTOMY         Family History:  Family History   Problem Relation Age of Onset    OSTEOARTHRITIS Mother     Hypertension Mother     OSTEOARTHRITIS Father        Social History:  Social History     Tobacco Use    Smoking status: Never Smoker    Smokeless tobacco: Never Used   Vaping Use    Vaping Use: Never used   Substance Use Topics    Alcohol use: No    Drug use: No       Allergies: Allergies   Allergen Reactions    Peanut Anaphylaxis    Aspirin Other (comments)    Doxycycline Rash    Gabapentin Unknown (comments)     Headaches    Sulfamethoxazole-Trimethoprim Rash         Review of Systems       Review of Systems   Constitutional: Negative for activity change, chills, diaphoresis, fatigue and fever. HENT: Positive for sore throat. Negative for congestion, ear pain, sinus pain and trouble swallowing. Throat swelling   Respiratory: Positive for shortness of breath and wheezing. Negative for cough, chest tightness and stridor. Cardiovascular: Negative for chest pain and palpitations. Gastrointestinal: Negative for abdominal distention, abdominal pain, constipation, diarrhea, nausea and vomiting. Genitourinary: Negative for difficulty urinating, dysuria and hematuria. Musculoskeletal: Negative for back pain, joint swelling and myalgias. Skin: Negative for rash and wound. Neurological: Positive for light-headedness. Negative for dizziness, tremors, seizures, syncope, speech difficulty, weakness, numbness and headaches. Psychiatric/Behavioral: Negative for agitation. The patient is not nervous/anxious. Physical Exam     Visit Vitals  BP (!) 126/59   Pulse (!) 132   Temp 99.1 °F (37.3 °C)   Resp 24   Ht 5' 5\" (1.651 m)   Wt 121.1 kg (267 lb)   SpO2 95%   BMI 44.43 kg/m²         Physical Exam  Constitutional:       General: He is not in acute distress.      Appearance: He is not toxic-appearing. HENT:      Head: Normocephalic and atraumatic. Mouth/Throat:      Mouth: Mucous membranes are moist.      Pharynx: No oropharyngeal exudate or posterior oropharyngeal erythema. Comments: Patient's oropharynx is erythematous and demonstrates mild edema. Uvula is midline. No exudate or lesions  Eyes:      Extraocular Movements: Extraocular movements intact. Conjunctiva/sclera: Conjunctivae normal.      Pupils: Pupils are equal, round, and reactive to light. Cardiovascular:      Rate and Rhythm: Normal rate and regular rhythm. Heart sounds: Normal heart sounds. No murmur heard. No friction rub. No gallop. Pulmonary:      Effort: Pulmonary effort is normal.      Breath sounds: Normal breath sounds. Abdominal:      General: There is no distension. Palpations: Abdomen is soft. There is no mass. Tenderness: There is no abdominal tenderness. There is no guarding. Hernia: No hernia is present. Musculoskeletal:         General: No swelling, tenderness or deformity. Cervical back: Normal range of motion and neck supple. Skin:     General: Skin is warm and dry. Findings: Rash present. Comments: Macular erythematous rash to the upper chest at the base of the neck   Neurological:      General: No focal deficit present. Mental Status: He is alert and oriented to person, place, and time.    Psychiatric:         Mood and Affect: Mood normal.           Diagnostic Study Results     Labs -  Recent Results (from the past 12 hour(s))   CBC WITH AUTOMATED DIFF    Collection Time: 02/28/22  4:44 PM   Result Value Ref Range    WBC 11.4 4.6 - 13.2 K/uL    RBC 5.66 (H) 4.35 - 5.65 M/uL    HGB 15.8 13.0 - 16.0 g/dL    HCT 48.6 (H) 36.0 - 48.0 %    MCV 85.9 78.0 - 100.0 FL    MCH 27.9 24.0 - 34.0 PG    MCHC 32.5 31.0 - 37.0 g/dL    RDW 13.2 11.6 - 14.5 %    PLATELET 896 808 - 368 K/uL    MPV 9.4 9.2 - 11.8 FL    NRBC 0.0 0  WBC    ABSOLUTE NRBC 0. 00 0.00 - 0.01 K/uL    NEUTROPHILS 57 40 - 73 %    LYMPHOCYTES 27 21 - 52 %    MONOCYTES 8 3 - 10 %    EOSINOPHILS 6 (H) 0 - 5 %    BASOPHILS 1 0 - 2 %    IMMATURE GRANULOCYTES 1 (H) 0.0 - 0.5 %    ABS. NEUTROPHILS 6.5 1.8 - 8.0 K/UL    ABS. LYMPHOCYTES 3.1 0.9 - 3.6 K/UL    ABS. MONOCYTES 0.9 0.05 - 1.2 K/UL    ABS. EOSINOPHILS 0.7 (H) 0.0 - 0.4 K/UL    ABS. BASOPHILS 0.1 0.0 - 0.1 K/UL    ABS. IMM. GRANS. 0.2 (H) 0.00 - 0.04 K/UL    DF AUTOMATED     METABOLIC PANEL, BASIC    Collection Time: 02/28/22  4:44 PM   Result Value Ref Range    Sodium 140 136 - 145 mmol/L    Potassium 3.7 3.5 - 5.5 mmol/L    Chloride 105 100 - 111 mmol/L    CO2 29 21 - 32 mmol/L    Anion gap 6 3.0 - 18 mmol/L    Glucose 87 74 - 99 mg/dL    BUN 21 (H) 7.0 - 18 MG/DL    Creatinine 0.95 0.6 - 1.3 MG/DL    BUN/Creatinine ratio 22 (H) 12 - 20      GFR est AA >60 >60 ml/min/1.73m2    GFR est non-AA >60 >60 ml/min/1.73m2    Calcium 8.7 8.5 - 10.1 MG/DL   TROPONIN-HIGH SENSITIVITY    Collection Time: 02/28/22  4:44 PM   Result Value Ref Range    Troponin-High Sensitivity 7 0 - 78 ng/L   EKG, 12 LEAD, INITIAL    Collection Time: 02/28/22  6:05 PM   Result Value Ref Range    Ventricular Rate 136 BPM    Atrial Rate 136 BPM    P-R Interval 146 ms    QRS Duration 86 ms    Q-T Interval 272 ms    QTC Calculation (Bezet) 409 ms    Calculated P Axis 73 degrees    Calculated R Axis 42 degrees    Calculated T Axis 70 degrees    Diagnosis       Sinus tachycardia with occasional premature ventricular complexes  Low voltage QRS  Borderline ECG  When compared with ECG of 31-JUL-2016 14:57,  premature ventricular complexes are now present  Criteria for Inferior infarct are no longer present         Radiologic Studies -   XR CHEST PORT    (Results Pending)         Medical Decision Making   I am the first provider for this patient.     I reviewed the vital signs, available nursing notes, past medical history, past surgical history, family history and social history. Vital Signs-Reviewed the patient's vital signs. Records Reviewed: Old Medical Records (Time of Review: 4:34 PM)    ED Course: Progress Notes, Reevaluation, and Consults:         Provider Notes (Medical Decision Making):   MDM  Number of Diagnoses or Management Options  Diagnosis management comments: 41-year-old male presenting to the emergency department for evaluation of allergic reaction. He is exam is remarkable for mild oropharyngeal edema. No wheezes heard on auscultation. Will administer subcutaneous epinephrine. Will give patient Benadryl, methylprednisolone, ranitidine, albuterol, and IV fluids. Will monitor patient very closely. 1730:  Patient's condition slightly worsening with worsening erythema of the face and neck and chest.  Tachycardic with a rate of 141 which can be explained by the albuterol and epinephrine. Blood pressure elevated 180s over 120s. Will attempt another IM epinephrine and revaluate in 10 min. Will give additional albuterol. If no improvement, will move to epinephrine drip. Consider endotracheal intubation. 1830:  Patient did improve after a second and a third dose of IM epinephrine as well as another albuterol breathing treatment. His erythema is improving. Bloodwork and CXR unremarkable. Will admit patient for continued monitoring in case of recurrence of oropharyngeal edema/anaphylaxis. 2108:  Discussed with Dr. Aaron Finn. After discussion, at this time we will continue to monitor the patient for another 8 hours here in the emergency department for any recurrence of symptoms. He continues to remain stable at this time. Still tachycardic with heart rate currently 120s. Breathing comfortably. Swelling is improved. Patient care will be signed out to Dr. Maurilio Aldridge to continue to monitor and facilitate disposition.                   Critical Care Time:  The services I provided to this patient were to treat and/or prevent clinically significant deterioration that could result in the failure of one or more body systems and/or organ systems due to anaphylaxis / angioedema    Services included the following:  -reviewing nursing notes and old charts  -vital sign assessments  -direct patient care  -medication orders and management  -interpreting and reviewing diagnostic studies/labs  -re-evaluations  -documentation time    Aggregate critical care time was 64 minutes, which includes only time during which I was engaged in work directly related to the patient's care as described above, whether I was at bedside or elsewhere in the Emergency Department. It did not include time spent performing other reported procedures or the services of residents, students, nurses, or advance practice providers. EQOflakito Medicus, DO    7:31 PM        Diagnosis     Clinical Impression:   1. Anaphylaxis, initial encounter        Disposition: tbd    Follow-up Information    None          Patient's Medications   Start Taking    No medications on file   Continue Taking    ALBUTEROL (PROVENTIL HFA, VENTOLIN HFA, PROAIR HFA) 90 MCG/ACTUATION INHALER    albuterol sulfate HFA 90 mcg/actuation aerosol inhaler    CHOLECALCIFEROL, VITAMIN D3, 2,000 UNIT TAB    Take  by Mouth. DIPHENHYDRAMINE (BENADRYL) 25 MG CAPSULE    Take 1 Cap by mouth every six (6) hours as needed. FAMOTIDINE (PEPCID) 20 MG TABLET    Take 1 Tab by mouth two (2) times a day. FEXOFENADINE (ALLEGRA) 180 MG TABLET    Take  by mouth. FLUTICASONE PROPIONATE (FLONASE NA)    by Nasal route. GALCANEZUMAB-GNLM (EMGALITY PEN) 120 MG/ML INJECTION    Emgality Pen 120 mg/mL subcutaneous pen injector    NORTRIPTYLINE HCL (PAMELOR PO)    nortriptyline   30mg 1 po qday    RED YEAST RICE 600 MG TAB    Take  by Mouth. TOPIRAMATE (TOPAMAX) 100 MG TABLET    Take  by mouth two (2) times a day.    These Medications have changed    No medications on file   Stop Taking    No medications on file     Disclaimer: Sections of this note are dictated using utilizing voice recognition software. Minor typographical errors may be present. If questions arise, please do not hesitate to contact me or call our department.

## 2022-02-28 NOTE — ED NOTES
Patient ambulated back to his room without any difficulty breathing and a even unlabored respiratory rate.

## 2022-03-01 VITALS
SYSTOLIC BLOOD PRESSURE: 138 MMHG | DIASTOLIC BLOOD PRESSURE: 78 MMHG | BODY MASS INDEX: 44.48 KG/M2 | OXYGEN SATURATION: 98 % | HEART RATE: 98 BPM | HEIGHT: 65 IN | TEMPERATURE: 98 F | RESPIRATION RATE: 23 BRPM | WEIGHT: 267 LBS

## 2022-03-01 LAB
ATRIAL RATE: 136 BPM
CALCULATED P AXIS, ECG09: 73 DEGREES
CALCULATED R AXIS, ECG10: 42 DEGREES
CALCULATED T AXIS, ECG11: 70 DEGREES
DIAGNOSIS, 93000: NORMAL
P-R INTERVAL, ECG05: 146 MS
Q-T INTERVAL, ECG07: 272 MS
QRS DURATION, ECG06: 86 MS
QTC CALCULATION (BEZET), ECG08: 409 MS
VENTRICULAR RATE, ECG03: 136 BPM

## 2022-03-01 NOTE — ED NOTES
Patient resting comfortably on the stretcher with call bell within reach. Patient has no signs or symptoms of acute distress at this time. Patient has no concerns or questions about his treatment plan. Report given to [facility] no further questions or concerns at this time.

## 2022-03-01 NOTE — ROUTINE PROCESS
Marbella Villeda is a 57 y.o. male that was discharged in stable. Pt was accompanied by self. Pt is driving. The patients diagnosis, condition and treatment were explained to  patient and aftercare instructions were given. The patient verbalized understanding. Patient armband removed and shredded.

## 2022-03-01 NOTE — ED NOTES
ED Course as of 02/28/22 [place] 4 Feb 28, 2022   2301 Notified by nurse patient considering discharge. Had informed discussion with the patient and appreciate Dr. Annette Hurd input recommendation was for monitoring for a total of 12 hours after his 3 doses of albuterol correction 3 doses of epi and albuterol. Discussed with the patient he is agreeable to stay nontoxic-appearing on monitor. Plan on discharge about 6 AM [CB]      ED Course User Index  [CB] Noemí Rivera MD     4:20 AM Pt reevaluated at this time. Discussed results and findings, as well as, diagnosis and plan for discharge. Follow up with doctors/services listed. Return to the emergency department for alarming symptoms. Pt verbalizes understanding and agreement with plan. All questions addressed. No alarming problems during the remainder of his stay in the ER he will be discharged.

## 2022-03-01 NOTE — ED NOTES
Patient resting comfortably on the stretcher with call bell within reach. Patient has no signs or symptoms of acute distress at this time. Patient has no concerns or questions about his treatment plan.